# Patient Record
Sex: MALE | Race: WHITE
[De-identification: names, ages, dates, MRNs, and addresses within clinical notes are randomized per-mention and may not be internally consistent; named-entity substitution may affect disease eponyms.]

---

## 2018-08-02 NOTE — EDM.PDOC
ED HPI GENERAL MEDICAL PROBLEM





- General


Chief Complaint: Laceration


Stated Complaint: CUT FINGER AT WORK


Time Seen by Provider: 08/02/18 15:57


Source of Information: Reports: Patient, RN Notes Reviewed





- History of Present Illness


INITIAL COMMENTS - FREE TEXT/NARRATIVE: 





suffered 1.0 cm lac L index finger cutting vegetables at work, wants to bleed, 

no other injury. 


  ** Left 3-Middle finger


Pain Score (Numeric/FACES): 3





- Related Data


 Allergies











Allergy/AdvReac Type Severity Reaction Status Date / Time


 


No Known Allergies Allergy   Verified 08/02/18 15:53











Home Meds: 


 Home Meds





. [No Known Home Meds]  05/10/18 [History]











Past Medical History





- Past Health History


Medical/Surgical History: Denies Medical/Surgical History





Social & Family History





- Family History


Family Medical History: Noncontributory





- Tobacco Use


Smoking Status *Q: Current Every Day Smoker


Years of Tobacco use: 10


Packs/Tins Daily: 0.5





- Recreational Drug Use


Recreational Drug Use: No





ED ROS GENERAL





- Review of Systems


Review Of Systems: See Below


HEENT: Reports: No Symptoms


Respiratory: Reports: No Symptoms


Cardiovascular: Reports: No Symptoms


GI/Abdominal: Denies: Nausea, Vomiting


Musculoskeletal: Reports: Other (Lac L index finger)


Neurological: Denies: Numbness, Tingling, Weakness





ED EXAM, SKIN/RASH


Exam: See Below


General Appearance: Alert, No Apparent Distress


Head: Atraumatic


Neck: Supple


Respiratory/Chest: No Respiratory Distress


Extremities: Other (1 cm lac distal L index finger)





ED SKIN PROCEDURES





- Laceration/Wound Repair


  ** Left Distal Hand


Lac/Wound length In cm: 1


Appearance: Linear


Distal NVT: Neuro & Vascular Intact


Anesthetic Type: Local


Local Anesthesia - Lidocaine (Xylocaine): 1% Plain


Skin Prep: Saline


Suture Size: 3-0


# of Sutures: 4





Course





- Vital Signs


Last Recorded V/S: 


 Last Vital Signs











Temp  98.4 F   08/02/18 15:51


 


Pulse  70   08/02/18 15:51


 


Resp  16   08/02/18 15:51


 


BP  146/96 H  08/02/18 15:51


 


Pulse Ox  98   08/02/18 15:51














- Orders/Labs/Meds


Meds: 


Medications














Discontinued Medications














Generic Name Dose Route Start Last Admin





  Trade Name Freq  PRN Reason Stop Dose Admin


 


Lidocaine HCl  50 ml  08/02/18 16:17  08/02/18 17:15





  Xylocaine 1%  INJECT  08/02/18 16:18  50 ml





  ONETIME ONE   Administration





     





     





     





     














Departure





- Departure


Time of Disposition: 17:30


Disposition: Home, Self-Care 01


Clinical Impression: 


Finger laceration


Qualifiers:


 Encounter type: initial encounter Finger: middle finger Damage to nail status: 

unspecified Foreign body presence: without foreign body Laterality: left 

Qualified Code(s): S61.213A - Laceration without foreign body of left middle 

finger without damage to nail, initial encounter








- Discharge Information


Instructions:  Laceration Care, Adult


Referrals: 


PCP,None [Primary Care Provider] - 


Forms:  ED Department Discharge


Additional Instructions: 


laceration care instr.    stitches out in about 10 days.   there is no charge 

if you have those removrd at our [facility] medical [facility].   call [phone] for appt.

## 2018-08-27 NOTE — EDM.PDOC
ED HPI GENERAL MEDICAL PROBLEM





- General


Chief Complaint: ENT Problem


Stated Complaint: JAW IS SWOLLEN


Time Seen by Provider: 08/27/18 17:16


Source of Information: Reports: Patient


History Limitations: Reports: No Limitations





- History of Present Illness


INITIAL COMMENTS - FREE TEXT/NARRATIVE: 


Patient is a 37-year-old male presents ED complaining of swelling to the left 

upper jaw and cheek. Patient states he awoke this morning with pain to the #14 

tooth that is fractured at the gumline. Swelling has persisted throughout the 

course today. He's been taking ibuprofen with little relief. Patient just 

recently moved to [place] to work and the oil patch. He does have an 

apartment and will follow up with a dentist here locally for definitive 

treatment. He has not been on any antibiotics recently. Denies any fever, sore 

throat, difficulty swallowing, or any additional complaints. Past medical 

history none. Current medications none. Surgical history none. Smokes half pack 

per day. Alcohol use none. Recreational drug use none.


Treatments PTA: Reports: NSAIDS


  ** Left Upper Tooth/Teeth


Pain Score (Numeric/FACES): 8





- Related Data


 Allergies











Allergy/AdvReac Type Severity Reaction Status Date / Time


 


No Known Allergies Allergy   Verified 08/27/18 16:54











Home Meds: 


 Home Meds





Penicillin V Potassium 500 mg PO Q6HR #40 tab 08/27/18 [Rx]











Past Medical History





- Past Health History


Medical/Surgical History: Denies Medical/Surgical History





- Infectious Disease History


Infectious Disease History: Reports: Chicken Pox





Social & Family History





- Family History


Family Medical History: Noncontributory





- Tobacco Use


Smoking Status *Q: Current Every Day Smoker


Years of Tobacco use: 5


Packs/Tins Daily: 0.5





- Caffeine Use


Caffeine Use: Reports: None





- Recreational Drug Use


Recreational Drug Use: No





ED ROS ENT





- Review of Systems


Review Of Systems: ROS reveals no pertinent complaints other than HPI.





ED EXAM, ENT





- Physical Exam


Exam: See Below


Exam Limited By: No Limitations


General Appearance: Alert, WD/WN, No Apparent Distress


Eye Exam: Bilateral Eye: Normal Inspection, PERRL


Ears: Hearing Grossly Normal


Nose: Normal Inspection


Mouth/Throat: Gum Swelling (Along the number 14th tooth. Tooth is broken off at 

the base with increased swelling or redness. No drainage noted. Pain with 

palpation with a tongue depressor. Swelling noted along the left cheek 

increased with palpation. No trismus present.).  No: Hoarse Voice, Muffled Voice

, Pharyngeal Erythema, Throat Pain, Throat Swelling, Tongue Swelling, Tonsillar 

Erythema, Tonsillar Exudates, Tonsillar Swelling, Trismus, Uvular Deviation, 

Uvular Edema


Neck: Normal Inspection, Supple, Non-Tender.  No: Lymphadenopathy (L), 

Lymphadenopathy (R)


Respiratory/Chest: No Respiratory Distress, No Accessory Muscle Use


Cardiovascular: Normal Peripheral Pulses, Regular Rate, Rhythm


Neurological: Alert, Oriented, CN II-XII Intact, Normal Cognition, No Motor/

Sensory Deficits


Psychiatric: Normal Affect, Normal Mood


Skin: Warm, Dry, Intact, Normal Color, No Rash





Course





- Vital Signs


Last Recorded V/S: 


 Last Vital Signs











Temp  99.7 F   08/27/18 16:50


 


Pulse  83   08/27/18 16:50


 


Resp  18   08/27/18 16:50


 


BP      


 


Pulse Ox  100   08/27/18 16:50














- Orders/Labs/Meds


Meds: 


Medications














Discontinued Medications














Generic Name Dose Route Start Last Admin





  Trade Name Darrick  PRN Reason Stop Dose Admin


 


Hydrocodone Bitart/Acetaminophen  1 tab  08/27/18 17:21  08/27/18 17:27





  Norco 325-10 Mg  PO  08/27/18 17:22  1 tab





  ONETIME ONE   Administration





     





     





     





     


 


Penicillin V Potassium  500 mg  08/27/18 17:22  





  Veetids  PO  08/27/18 17:23  





  ONETIME ONE   





     





     





     





     














- Re-Assessments/Exams


Free Text/Narrative Re-Assessment/Exam: 








Ordered Norco one tab by mouth and also penicillin 1 tablet by mouth. Patient 

will be discharged home with prescription for penicillin. No narcotic prescript 

provided. Discharge instructions as documented. The patient remained 

hemodynamically stable while under my care in the E.D. I discussed the 

concerning symptoms for which to returnto the E.D. with the patient. The 

patient verbalized understanding. All questions were answered. Patient states 

he has a ride from the ED after receiving a sedative medication.








Departure





- Departure


Time of Disposition: 17:25


Disposition: Home, Self-Care 01


Condition: Good


Clinical Impression: 


 Infected dental caries








- Discharge Information


Prescriptions: 


Penicillin V Potassium 500 mg PO Q6HR #40 tab


Instructions:  Dental Abscess


Forms:  ED Department Discharge


Additional Instructions: 


Take the full course of antibiotic as prescribed. Continue utilizing ibuprofen 

and Tylenol in alternating fashion for discomfort. Utilize any over-the-counter 

method for pain relief such as Orajel or episode. Call and make an appointment 

with the dentist for definitive treatment. Establish medical care with a 

primary care provider here locally for further pain management. Please return 

back to the ED if you develop any new or worsening symptoms.

## 2018-10-02 NOTE — EDM.PDOC
ED HPI GENERAL MEDICAL PROBLEM





- General


Chief Complaint: ENT Problem


Stated Complaint: TOOTH PAIN


Time Seen by Provider: 10/02/18 16:28


Source of Information: Reports: Patient, Old Records (ED 8/27/2018), RN Notes 

Reviewed


History Limitations: Reports: No Limitations





- History of Present Illness


INITIAL COMMENTS - FREE TEXT/NARRATIVE: 





Medical records indicate that the patient was seen in this ED on 8/27/2018 with 

a complaint of pain and swelling to his left upper jaw and cheek. On evaluation

, the patient was found to have a fracture of tooth #14 with associated 

gingival swelling noted drainage was found. The patient was given one tablet 

each of Norco and penicillin in the ED, before being discharged home with a ten-

day prescription for penicillin. The patient was to make an appointment to 

follow-up with a dentist.





The patient now returns to the ED stating that he has continued to have pain in 

that same left upper tooth. He states that he has finished the prescription for 

the penicillin, but he has not followed up with warm made an appointment to see 

a dentist, citing lack of insurance. He states that he expects to have antral 

insurance in 2-3 weeks.





No recent fever. The patient states that he has had a funny taste in his mouth.





The patient does not have a PCP.








Treatments PTA: Reports: NSAIDS


  ** Left Upper Face


Pain Score (Numeric/FACES): 8





- Related Data


 Allergies











Allergy/AdvReac Type Severity Reaction Status Date / Time


 


No Known Allergies Allergy   Verified 08/27/18 16:54











Home Meds: 


 Home Meds





Naproxen 500 mg PO Q12H PRN #20 tablet 10/02/18 [Rx]


Penicillin V Potassium 500 mg PO Q6HR #40 tab 10/02/18 [Rx]











Past Medical History





- Past Health History


Medical/Surgical History: Denies Medical/Surgical History





- Infectious Disease History


Infectious Disease History: Reports: Chicken Pox





Social & Family History





- Family History


Family Medical History: Noncontributory





- Tobacco Use


Smoking Status *Q: Current Every Day Smoker


Years of Tobacco use: 5


Packs/Tins Daily: 0.5


Packs/Tins Daily Comment: Down from 1 ppd





- Caffeine Use


Caffeine Use: Reports: None





- Alcohol Use


Alcohol Use History: No





- Recreational Drug Use


Recreational Drug Use: No





- Living Situation & Occupation


Living situation: Reports:  (getting a divorce), Alone


Occupation: Employed ([employer])





ED ROS ENT





- Review of Systems


Review Of Systems: ROS reveals no pertinent complaints other than HPI.





ED EXAM, ENT





- Physical Exam


Exam: See Below


Exam Limited By: No Limitations


General Appearance: Alert, WD/WN, No Apparent Distress


Eye Exam: Bilateral Eye: EOMI, Normal Inspection


Ears: Normal External Exam, Normal Canal, Hearing Grossly Normal, Normal TMs


Nose: Normal Inspection, Normal Mucousa, No Blood


Mouth/Throat: Normal Inspection, Normal Lips, Other (Tooth #13 decayed to the 

gingiva. Tooth #14 deeply carious. Teeth #16, 17, 20, 29, 30 absent. Mild 

swelling about tooth #14, but no pointing seen.)


Head: Atraumatic, Normocephalic.  No: Facial Swelling


Neck: Normal Inspection, Supple, Non-Tender, Full Range of Motion.  No: 

Lymphadenopathy (L), Lymphadenopathy (R)





Course





- Vital Signs


Last Recorded V/S: 


 Last Vital Signs











Temp  36.7 C   10/02/18 16:24


 


Pulse  88   10/02/18 16:24


 


Resp  20   10/02/18 16:24


 


BP  180/99 H  10/02/18 16:24


 


Pulse Ox  98   10/02/18 16:24














- Orders/Labs/Meds


Meds: 


Medications














Discontinued Medications














Generic Name Dose Route Start Last Admin





  Trade Name Darrick  PRN Reason Stop Dose Admin


 


Naproxen  500 mg  10/02/18 16:50  10/02/18 17:08





  Naprosyn  PO  10/02/18 16:51  500 mg





  ONETIME STA   Administration





     





     





     





     


 


Penicillin V Potassium  500 mg  10/02/18 16:50  10/02/18 17:08





  Veetids  PO  10/02/18 16:51  500 mg





  ONETIME ONE   Administration





     





     





     





     














- Re-Assessments/Exams


Free Text/Narrative Re-Assessment/Exam: 





10/02/18 16:52


The patient continues to have pain from tooth #14. I don't see an obvious 

infection, but it does look painful. The patient has not followed up with a 

dentist, as he promised previously, citing lack of insurance. I will renew his 

prescription for penicillin, and also write a prescription for naproxen. The 

patient will be given a list of local dentist, with the suggestion that he call 

each every one to see if he can make a payment plan. He swears this time that 

he will follow up with a dentist.





Departure





- Departure


Time of Disposition: 16:53


Disposition: Home, Self-Care 01


Condition: Fair


Clinical Impression: 


 Dental infection, Dentalgia








- Discharge Information


*PRESCRIPTION DRUG MONITORING PROGRAM REVIEWED*: Not Applicable


*COPY OF PRESCRIPTION DRUG MONITORING REPORT IN PATIENT LISETH: Not Applicable


Prescriptions: 


Penicillin V Potassium 500 mg PO Q6HR #40 tab


Naproxen 500 mg PO Q12H PRN #20 tablet


 PRN Reason: Pain


Instructions:  Dental Abscess, Easy-to-Read


Referrals: 


PCP,None [Primary Care Provider] - 


Forms:  ED Department Discharge


Additional Instructions: 


You were seen in the emergency room for continued left upper dental pain.





On examination, you may have a dental infection. Tooth #14 is rotted.





You have been started on the antibiotic penicillin and the pain medicine 

naproxen. A (repeat) prescription for penicillin and naproxen have been sent to 

the [facility], [address] [place] [address], located just south and 

across the street from [facility].





Take one tablet of penicillin every 6 hours, as prescribed.





Take one tablet of naproxen every 12 hours, with food, as prescribed.





A list of local dentists has been provided to you. As discussed, we recommend 

that you contact these dentists to see if they would be willing to set up a 

payment plan with you.





If any other problems, please do not hesitate to return to the ER.

## 2020-12-23 ENCOUNTER — HOSPITAL ENCOUNTER (EMERGENCY)
Dept: HOSPITAL 41 - JD.ED | Age: 40
LOS: 1 days | Discharge: HOME | End: 2020-12-24
Payer: MEDICAID

## 2020-12-23 DIAGNOSIS — F17.210: ICD-10-CM

## 2020-12-23 DIAGNOSIS — Z23: ICD-10-CM

## 2020-12-23 DIAGNOSIS — L02.414: Primary | ICD-10-CM

## 2020-12-23 DIAGNOSIS — F15.10: ICD-10-CM

## 2020-12-23 PROCEDURE — 99284 EMERGENCY DEPT VISIT MOD MDM: CPT

## 2020-12-23 PROCEDURE — 10061 I&D ABSCESS COMP/MULTIPLE: CPT

## 2020-12-23 PROCEDURE — 87070 CULTURE OTHR SPECIMN AEROBIC: CPT

## 2020-12-23 PROCEDURE — 87077 CULTURE AEROBIC IDENTIFY: CPT

## 2020-12-23 PROCEDURE — 87186 SC STD MICRODIL/AGAR DIL: CPT

## 2020-12-23 PROCEDURE — 90471 IMMUNIZATION ADMIN: CPT

## 2020-12-23 PROCEDURE — G0008 ADMIN INFLUENZA VIRUS VAC: HCPCS

## 2020-12-23 PROCEDURE — 90686 IIV4 VACC NO PRSV 0.5 ML IM: CPT

## 2020-12-23 PROCEDURE — 87641 MR-STAPH DNA AMP PROBE: CPT

## 2020-12-23 NOTE — EDM.PDOC
ED HPI GENERAL MEDICAL PROBLEM





- General


Chief Complaint: Skin Complaint


Stated Complaint: ABCESS ON LEFT ARM


Time Seen by Provider: 12/23/20 21:37


Source of Information: Reports: Patient


History Limitations: Reports: No Limitations





- History of Present Illness


INITIAL COMMENTS - FREE TEXT/NARRATIVE: 





Mr. Aviles is a very pleasant 40-year-old gentleman who now presents to the ED 

with painful swelling to his left forearm.  He states that he injected 

methamphetamine to the area about 3 days ago, Sunday, 12/20/2020, and that the 

following day, Monday, 12/21/2020, he developed painful redness.  Since then, 

the area has become swollen and more tender.  No recent fever.  He states that 

he was given about 6 tablets of Bactrim DS by someone last night, and that he 

took all 6 between last night and this morning.  Otherwise, no specific 

treatment.  No prior similar symptoms.





Here in the ED, the patient's initial BP is found to be elevated at 192/114, 

with a HR of 126 BPM.  He is afebrile, saturating 100% on room air.





Other than his left forearm issue, the patient denies having a recent fever, 

chills, sore throat, ear pain, nasal or sinus congestion, cough, dyspnea, chest 

pain, palpitations, nausea, vomiting, constipation, diarrhea, abdominal pain, 

urinary symptoms, recent weight gain or weight loss, recent bloody bowel 

movements or black bowel movements, recent joint aches, headaches, or rashes.








The patient does not have a PCP.


He has not received an influenza vaccine this season, but agreed to receive one 

here in the ED.








  ** Left Lower Arm


Pain Score (Numeric/FACES): 10





- Related Data


                                    Allergies











Allergy/AdvReac Type Severity Reaction Status Date / Time


 


No Known Allergies Allergy   Verified 12/23/20 21:26











Home Meds: 


                                    Home Meds





cephALEXin [Keflex] 1 cap PO Q6H #40 cap 12/24/20 [Rx]











Past Medical History


Psychiatric History: Reports: Addiction (methamphetamine)


Other Psychiatric History: currently on drug patch





- Infectious Disease History


Infectious Disease History: Reports: Chicken Pox





Social & Family History





- Tobacco Use


Tobacco Use Status *Q: Current Every Day Tobacco User


Years of Tobacco use: 21


Packs/Tins Daily: 0.5





- Caffeine Use


Caffeine Use: Reports: Tea





- Alcohol Use


Alcohol Use History: No





- Recreational Drug Use


Recreational Drug Use: Yes


Drug Use in Last 12 Months: Yes


Recreational Drug Type: Reports: Marijuana/Hashish (last smoked 2017), 

Methamphetamine (injects about once a month, last = 12/20/2020)





- Living Situation & Occupation


Living situation: Reports: Single, with Family (Brother)


Occupation: Unemployed





ED ROS GENERAL





- Review of Systems


Review Of Systems: Comprehensive ROS is negative, except as noted in HPI.





ED EXAM, SKIN/RASH


Exam: See Below


Exam Limited By: No Limitations


General Appearance: Alert, WD/WN, No Apparent Distress, Anxious


Extremities: Other (There is a patch of erythema to the volar aspect of the 

patient's mid left forearm measuring approximately 6 cm x 4 cm, towards the 

center of which is an approximately 2 cm diameter area of swelling.  The area of

 swelling is indurated to palpation, not fluctuant, however, it is very tender. 

 Neurovascular status of the left upper extremity is intact.)





ED SKIN PROCEDURES





- I&D


Site: left forearm


Skin Prep: Chlorhexidine (Hibiciens)


Local Anesthesia: Lidocaine: 1% with EPI (50:50 admixture)


Local Anesthesia - Bupivicaine (Marcaine): 0.5% Plain (50:50 admixture)


Local Anesthetic Volume: 3cc


Area Incised With: 15 Blade


Drainage: Purulent, Bloody, Moderate Amount


Probed to Break Up Loculations: Yes


Packed With: 1/2 in. Iodoform


Sterile Dressing: 4x4(s)


Complications: No





Course





- Vital Signs


Last Recorded V/S: 


                                Last Vital Signs











Temp  36.8 C   12/23/20 21:23


 


Pulse  126 H  12/23/20 21:23


 


Resp  16   12/23/20 21:23


 


BP  192/114 H  12/23/20 21:23


 


Pulse Ox  100   12/23/20 21:23














- Orders/Labs/Meds


Orders: 


                               Active Orders 24 hr











 Category Date Time Status


 


 CULTURE WOUND [RM] Stat Lab  12/23/20 22:32 Ordered











Labs: 


                                Laboratory Tests











  12/23/20 Range/Units





  21:50 


 


MRSA (PCR)  Negative  











Meds: 


Medications














Discontinued Medications














Generic Name Dose Route Start Last Admin





  Trade Name Freq  PRN Reason Stop Dose Admin


 


Hydrocodone Bitart/Acetaminophen  2 tab  12/24/20 00:15  12/24/20 00:47





  Norco 325-5 Mg  PO  12/24/20 00:16  2 tab





  ONETIME ONE   Administration


 


Bupivacaine HCl  10 ml  12/23/20 21:57  12/23/20 22:20





  Sensorcaine-Mpf 0.5%  INJECT  12/23/20 21:58  10 ml





  ONETIME ONE   Administration


 


Cephalexin  500 mg  12/24/20 00:11  12/24/20 00:46





  Keflex  PO  12/24/20 00:12  500 mg





  ONETIME STA   Administration


 


Influenza Virus Vaccine  60 mcg  12/23/20 22:15  12/23/20 22:27





  Fluzone Quad [id number] Syringe  IM  12/23/20 22:16  60 mcg





  .ONCE ONE   Administration


 


Lidocaine/Epinephrine  20 ml  12/23/20 21:57  12/23/20 22:20





  Xylocaine 1% With Epinephrine 1:100,000  INJECT  12/23/20 21:58  20 ml





  ONETIME ONE   Administration














- Re-Assessments/Exams


Free Text/Narrative Re-Assessment/Exam: 





12/23/20 21:57


I applied a bedside ultrasound to the erythematous lump on the patient's left 

forearm, finding a small irregularly-shaped pocket of fluid about 1 cm beneath 

the surface, consistent with a small abscess.  I have ordered an MRSA by PCR 

screen, and will proceed with incision and drainage of the abscess.








12/23/20 22:23


Incision and drainage of the left forearm abscess was performed by first 

cleaning the area with chlorhexidine, then locally injecting with a 50-50 

admixture of bupivacaine 0.5% without epinephrine and lidocaine 1% with 

lidocaine.  The area blanched well.  An approximately 1.5 cm linear incision was

 made over the abscess using a #15 blade.  A culture was obtained from within 

the abscess, not from draining fluid.  Loculations within the abscess were then 

disrupted using 2 cotton-tipped swabs.  The abscess was then squeezed, 

recovering a moderate amount of bloody pus.  The wound was then packed with 1/2 

inch iodoform.  A dressing will be applied per Ethel BRIDGES.  The patient tolerated

 the procedure well.





Case then discussed with Dr. Carroll at 22:21.  He will hold office for half a 

day tomorrow (Viola mendoza).  He recommended that the patient show up at his 

office around 8:00 tomorrow morning, then they will squeeze him in.  He agreed 

with my plan to place the patient on Bactrim DS if his MRSA screen returns 

positive, cephalexin if it returns negative.








12/24/20 00:11


The patient's MRSA by PCR screen has returned negative.  I have therefore 

ordered 500 mg of oral Keflex.  I will discharge him home with a prescription 

for the same, along with a referral to Dr. Carroll.








12/24/20 00:15


MRSA results discussed with the patient.  The patient asked about pain 

medication.  I have ordered 2 tablets of Norco, but I am not going to prescribe 

any, given his history of drug abuse.  I will submit a prescription for Keflex 

500 mg Q 6 hrs, and the patient is to follow-up with Dr. Carroll at 8:00 

tomorrow morning.  If Dr. Carroll believes that the patient should be on an 

opioid pain reliever, he can prescribe that tomorrow.





The patient will be given an influenza vaccine prior to discharge.











Departure





- Departure


Time of Disposition: 00:15


Disposition: Home, Self-Care 01


Condition: Good


Clinical Impression: 


 Abscess of left forearm, Methamphetamine abuse








- Discharge Information


*PRESCRIPTION DRUG MONITORING PROGRAM REVIEWED*: Not Applicable


*COPY OF PRESCRIPTION DRUG MONITORING REPORT IN PATIENT LISETH: Not Applicable


Prescriptions: 


cephALEXin [Keflex] 1 cap PO Q6H #40 cap


Instructions:  Skin Abscess, Easy-to-Read


Referrals: 


Veronica Carroll MD [Physician] - 


Forms:  ED Department Discharge


Additional Instructions: 


You were seen in the emergency room after developing an abscess to her left 

forearm from injecting methamphetamine.





The abscess was incised, drained, and packed in the ER.





An MRSA by PCR screen returned negative.





You have been started on the antibiotic cephalexin (Keflex), and a prescription 

for cephalexin has been sent to the [facility], located at [address] [place] [address].  The pharmacy will be open between 9 AM and 3 PM today, 

Thursday, 12/24/2020.  Take 1 tablet of Keflex every 6 hours, as prescribed.  

Finish the entire prescription unless told otherwise by Dr. Carroll.





Follow-up with Dr. Carroll in his office at 8:00 this morning.  Be prepared to 

wait for several hours.  He will squeeze you into his schedule.





If any other problems, please do not hesitate to return to the ER.








**You were given an influenza vaccine during your ER visit.**





Sepsis Event Note (ED)





- Evaluation


Sepsis Screening Result: No Definite Risk





- Focused Exam


Vital Signs: 


                                   Vital Signs











  Temp Pulse Resp BP Pulse Ox


 


 12/23/20 21:23  36.8 C  126 H  16  192/114 H  100














- My Orders


Last 24 Hours: 


My Active Orders





12/23/20 22:32


CULTURE WOUND [RM] Stat 














- Assessment/Plan


Last 24 Hours: 


My Active Orders





12/23/20 22:32


CULTURE WOUND [RM] Stat

## 2021-06-30 ENCOUNTER — HOSPITAL ENCOUNTER (EMERGENCY)
Dept: HOSPITAL 41 - JD.ED | Age: 41
Discharge: HOME | End: 2021-06-30
Payer: MEDICAID

## 2021-06-30 DIAGNOSIS — Z72.0: ICD-10-CM

## 2021-06-30 DIAGNOSIS — G43.909: Primary | ICD-10-CM

## 2021-06-30 PROCEDURE — 96374 THER/PROPH/DIAG INJ IV PUSH: CPT

## 2021-06-30 PROCEDURE — 99283 EMERGENCY DEPT VISIT LOW MDM: CPT

## 2021-06-30 PROCEDURE — 96375 TX/PRO/DX INJ NEW DRUG ADDON: CPT

## 2021-06-30 PROCEDURE — 70450 CT HEAD/BRAIN W/O DYE: CPT

## 2021-06-30 NOTE — CT
Head CT

 

Technique: Multiple axial sections through the brain were obtained.  

Intravenous contrast was not utilized.  Reconstructed coronal and 

sagittal images were also obtained.

 

Comparison: No prior intracranial imaging is available.

 

Findings: Extra-axial calcified mass is noted within the left parietal

 region.  This finding measures approximately 2.6 cm in greatest size 

and most likely represents a meningioma.

 

Meningioma causes mild localized mass-effect.  No midline shift is 

seen.  Ventricular system is normal.  No abnormal parenchymal 

densities are seen.  No evidence of intracranial hemorrhage is seen.

 

Bone window settings were reviewed which show no acute calvarial 

abnormality.  Minimal mucosal thickening is seen within the ethmoid 

sinuses.  No acute paranasal sinus findings are seen.  Visualized 

mastoid sinuses also show nothing acute.

 

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  This

 is believed to be incidental.

 

Impression:

1.  Extra-axial calcified lesion measuring up to 2.6 cm within the 

left parietal region.  This is compatible with meningioma.

2.  No acute intracranial abnormality is otherwise appreciated on 

noncontrast head CT study.

 

Diagnostic code #2

 

I agree with preliminary report from [facility], finalized on 06/30/22, 6:55

 AM CDT, code 1

## 2021-06-30 NOTE — EDM.PDOC
ED HPI GENERAL MEDICAL PROBLEM





- General


Chief Complaint: Headache


Stated Complaint: HEADACHE


Time Seen by Provider: 06/30/21 04:11





- History of Present Illness


INITIAL COMMENTS - FREE TEXT/NARRATIVE: 





40-year-old male presents the emergency room with a migraine headache.





Patient states he gets these periodically this was been going on over 24 hours 

and is much more severe than normal.  Is associated with some nausea photophob

ia.  Patient states he is got a tumor that was supposedly benign in the left 

frontal lobe.  He has not had follow-up for this in several years he was 

evaluated for this down in [place] before moving to this area.


  ** Headache


Pain Score (Numeric/FACES): 18





- Related Data


                                    Allergies











Allergy/AdvReac Type Severity Reaction Status Date / Time


 


No Known Allergies Allergy   Verified 06/30/21 04:01











Home Meds: 


                                    Home Meds





. [No Known Home Meds]  06/30/21 [History]











Past Medical History





- Past Health History


Medical/Surgical History: Denies Medical/Surgical History


HEENT History: Reports: Other (See Below)


Other HEENT History: dental pain


Neurological History: Reports: Headaches, Chronic, Other (See Below)


Other Neuro History: brain tumor


Psychiatric History: Reports: Addiction


Other Psychiatric History: currently on drug patch





- Infectious Disease History


Infectious Disease History: Reports: Chicken Pox





Social & Family History





- Family History


Family Medical History: No Pertinent Family History





- Tobacco Use


Tobacco Use Status *Q: Current Every Day Tobacco User


Years of Tobacco use: 10


Packs/Tins Daily: 0.5





- Caffeine Use


Caffeine Use: Reports: Tea





- Recreational Drug Use


Recreational Drug Use: No





- Living Situation & Occupation


Living situation: Reports: Single, with Family (Brother)


Occupation: Unemployed





ED ROS GENERAL





- Review of Systems


Review Of Systems: See Below


Constitutional: Reports: No Symptoms


HEENT: Reports: No Symptoms, Other (Photophobia)


Respiratory: Reports: No Symptoms


Cardiovascular: Reports: No Symptoms


GI/Abdominal: Reports: Nausea.  Denies: Abdominal Pain, Constipation, Diarrhea, 

Vomiting


: Reports: No Symptoms


Musculoskeletal: Reports: No Symptoms


Skin: Reports: No Symptoms


Neurological: Reports: Headache





ED EXAM, GENERAL





- Physical Exam


Exam: See Below


Exam Limited By: No Limitations


General Appearance: Moderate Distress (From the discomfort)


Eye Exam: Bilateral Eye: Other (Too hard to evaluate with this photophobia)


Ear Exam: Bilateral Ear: Auricle Normal, Canal Normal, TM normal


Nose: Normal Inspection, Normal Mucosa, No Blood


Throat/Mouth: Normal Inspection, Normal Lips, Normal Gums, Normal Oropharynx, 

Normal Voice, No Airway Compromise


Head: Atraumatic, Normocephalic


Neck: Normal Inspection, Supple, Non-Tender, Full Range of Motion


Respiratory/Chest: No Respiratory Distress, Lungs Clear, Normal Breath Sounds


Cardiovascular: Normal Peripheral Pulses, Regular Rate, Rhythm, No Edema


GI/Abdominal: Normal Bowel Sounds, Soft, Non-Tender


Neurological: Alert, Oriented, CN II-XII Intact, Other (More thorough exam not 

practical with the patient's condition)





Course





- Vital Signs


Last Recorded V/S: 


                                Last Vital Signs











Temp  36.3 C   06/30/21 03:58


 


Pulse  94   06/30/21 03:58


 


Resp  16   06/30/21 03:58


 


BP  175/114 H  06/30/21 03:58


 


Pulse Ox  98   06/30/21 03:58














- Orders/Labs/Meds


Orders: 


                               Active Orders 24 hr











 Category Date Time Status


 


 Head wo Cont [CT] Stat Exams  06/30/21 04:59 Taken











Meds: 


Medications














Discontinued Medications














Generic Name Dose Route Start Last Admin





  Trade Name Freq  PRN Reason Stop Dose Admin


 


Diphenhydramine HCl  50 mg  06/30/21 04:19  06/30/21 04:29





  Diphenhydramine 50 Mg/Ml Sdv  IVPUSH  06/30/21 04:20  50 mg





  ONETIME ONE   Administration


 


Lactated Ringer's  500 mls @ 999 mls/hr  06/30/21 04:19  06/30/21 04:29





  Ringers, Lactated  IV  06/30/21 04:49  999 mls/hr





  .BOLUS ONE   Administration


 


Prochlorperazine Edisylate  10 mg  06/30/21 04:19  06/30/21 04:28





  Prochlorperazine 10 Mg/2 Ml Sdv  IVPUSH  06/30/21 04:20  10 mg





  ONETIME ONE   Administration














- Re-Assessments/Exams


Free Text/Narrative Re-Assessment/Exam: 





06/30/21 06:15


Pain and CT of the head and it does show a largely calcified extra axial lesion 

thought to be a meningioma.





The patient was given IV fluids Benadryl Compazine and at this time is 

significantly better not completely resolved but he feels he can go home and get

 some rest at this time.





Departure





- Departure


Time of Disposition: 06:18


Disposition: Home, Self-Care 01


Clinical Impression: 


 Migraine








- Discharge Information


Forms:  ED Department Discharge


Additional Instructions: 


Return to the emergency room with any questions problems or concerning symptoms.





Go straight home and get some sleep.





Sepsis Event Note (ED)





- Evaluation


Sepsis Screening Result: No Definite Risk





- Focused Exam


Vital Signs: 


                                   Vital Signs











  Temp Pulse Resp BP Pulse Ox


 


 06/30/21 03:58  36.3 C  94  16  175/114 H  98














- My Orders


Last 24 Hours: 


My Active Orders





06/30/21 04:59


Head wo Cont [CT] Stat 














- Assessment/Plan


Last 24 Hours: 


My Active Orders





06/30/21 04:59


Head wo Cont [CT] Stat